# Patient Record
Sex: MALE
[De-identification: names, ages, dates, MRNs, and addresses within clinical notes are randomized per-mention and may not be internally consistent; named-entity substitution may affect disease eponyms.]

---

## 2024-08-18 ENCOUNTER — NON-APPOINTMENT (OUTPATIENT)
Age: 49
End: 2024-08-18

## 2024-08-26 PROBLEM — Z00.00 ENCOUNTER FOR PREVENTIVE HEALTH EXAMINATION: Status: ACTIVE | Noted: 2024-08-26

## 2024-08-27 ENCOUNTER — APPOINTMENT (OUTPATIENT)
Dept: ORTHOPEDIC SURGERY | Facility: CLINIC | Age: 49
End: 2024-08-27
Payer: OTHER MISCELLANEOUS

## 2024-08-27 DIAGNOSIS — S62.524A NONDISPLACED FRACTURE OF DISTAL PHALANX OF RIGHT THUMB, INITIAL ENCOUNTER FOR CLOSED FRACTURE: ICD-10-CM

## 2024-08-27 DIAGNOSIS — S63.641A SPRAIN OF METACARPOPHALANGEAL JOINT OF RIGHT THUMB, INITIAL ENCOUNTER: ICD-10-CM

## 2024-08-27 PROCEDURE — L3807: CPT | Mod: RT

## 2024-08-27 PROCEDURE — 99204 OFFICE O/P NEW MOD 45 MIN: CPT

## 2024-08-27 PROCEDURE — 73140 X-RAY EXAM OF FINGER(S): CPT | Mod: RT

## 2024-08-27 NOTE — ASSESSMENT
[FreeTextEntry1] : The condition was explained to the patient.  Fracture alignment within acceptable limits. Plan to proceed with non-operative treatment. Risks of treatment include, but are not limited to persistent pain, stiffness, fracture displacement, post-traumatic arthritis, need for future surgery, mal-union, non-union.  - MRI R thumb to evaluate for collateral ligament tear at MPJ. - Recommend thumb spica brace to immobilize thumb MPJ sprain and distal phalanx fracture, to be worn full time except hygiene. Fit for and provided brace in office today.  We discussed that there is a moderate risk of complications and morbidity with bracing, including skin irritation, skin breakdown, and stiffness from immobilization. They expressed understanding. - light activity: limit pinching, pushing, pulling, lifting, twisting, and at-risk activity.  - Work: recommend OOW (temporarily totally disabled).   F/u after MRI.  FORMAL REQUEST FOR AUTHORIZATION FOR MRI OF RIGHT THUMB. FORMAL REQUEST FOR AUTHORIZATION FOR DME - RIGHT THUMB SPICA BRACE.

## 2024-08-27 NOTE — IMAGING
[de-identified] : RIGHT HAND skin intact. mild swelling throughout thumb. TTP to thumb MPJ > dorsal radial IPJ. wrist ROM: good extension, flexion. good pronation, supination. good EPL, limited FPL. fingers good extension, flex to full fist. good finger abduction and adduction. + pain with thumb flexion/extension. numbness of tip of thumb. SILT to other digits. palpable radial pulse, brisk cap refill all digits. no triggering.  thumb MPJ stress: exam limited due to pain/guarding. - RCL: + pain, no gross instability. - UCL: + pain, no gross instability at neutral.   XRAYS OF RIGHT THUMB (3 views - PA, OBLIQUE, AND LATERAL VIEWS): thumb distal phalanx dorsal radial base fx with mild displacement. concentric IPJ.

## 2024-08-27 NOTE — IMAGING
[de-identified] : RIGHT HAND skin intact. mild swelling throughout thumb. TTP to thumb MPJ > dorsal radial IPJ. wrist ROM: good extension, flexion. good pronation, supination. good EPL, limited FPL. fingers good extension, flex to full fist. good finger abduction and adduction. + pain with thumb flexion/extension. numbness of tip of thumb. SILT to other digits. palpable radial pulse, brisk cap refill all digits. no triggering.  thumb MPJ stress: exam limited due to pain/guarding. - RCL: + pain, no gross instability. - UCL: + pain, no gross instability at neutral.   XRAYS OF RIGHT THUMB (3 views - PA, OBLIQUE, AND LATERAL VIEWS): thumb distal phalanx dorsal radial base fx with mild displacement. concentric IPJ.
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
## 2024-08-27 NOTE — WORK
[Fracture] : fracture [Sprain/Strain] : sprain/strain [Was the competent medical cause of the injury] : was the competent medical cause of the injury [I provided the services listed above] :  I provided the services listed above.
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
## 2024-09-03 ENCOUNTER — APPOINTMENT (OUTPATIENT)
Dept: MRI IMAGING | Facility: CLINIC | Age: 49
End: 2024-09-03
Payer: OTHER MISCELLANEOUS

## 2024-09-03 PROCEDURE — 73218 MRI UPPER EXTREMITY W/O DYE: CPT | Mod: RT

## 2024-09-13 ENCOUNTER — APPOINTMENT (OUTPATIENT)
Dept: ORTHOPEDIC SURGERY | Facility: CLINIC | Age: 49
End: 2024-09-13
Payer: OTHER MISCELLANEOUS

## 2024-09-13 DIAGNOSIS — S63.641A SPRAIN OF METACARPOPHALANGEAL JOINT OF RIGHT THUMB, INITIAL ENCOUNTER: ICD-10-CM

## 2024-09-13 DIAGNOSIS — S62.524A NONDISPLACED FRACTURE OF DISTAL PHALANX OF RIGHT THUMB, INITIAL ENCOUNTER FOR CLOSED FRACTURE: ICD-10-CM

## 2024-09-13 PROCEDURE — 99214 OFFICE O/P EST MOD 30 MIN: CPT

## 2024-09-13 NOTE — IMAGING
[de-identified] : RIGHT HAND skin intact. mild swelling throughout thumb.  TTP to thumb MPJ > dorsal radial IPJ. wrist ROM: good extension, flexion. good pronation, supination. good EPL, limited FPL. fingers good extension, flex to full fist. good finger abduction and adduction. numbness of tip of thumb. SILT to other digits. palpable radial pulse, brisk cap refill all digits. no triggering.
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
## 2024-09-13 NOTE — ASSESSMENT
[FreeTextEntry1] : MRI R thumb reviewed with patient - RCL tear, UCL tear with Stener lesion. The condition was explained to the patient.  Given UCL tear with Stener lesion, recommend surgery.  Discussed the risks and benefits of surgical vs non-surgical treatment. Discussed the risk of bleeding, infection - which may require antibiotics or surgical debridement - persistent pain, swelling, stiffness, loss of motion, weakness. Risk of persistent instability, post-traumatic arthritis, exacerbation of existing MPJ arthritis. Risk of re-rupture, which may require revision surgery. Risk of injury to tendons, blood vessels, and nerves - which may cause loss of function or loss of limb. Risk of scar tenderness, hypersensitivity, cold sensitivity, and CRPS. Will require therapy post-operatively to optimize range of motion and function. Surgery also carries a risk of complications related to anesthesia. All patient questions were answered. Patient expressed understanding and would like to proceed with RIGHT thumb UCL repair vs reconstruction with palmaris longus autograft. Also discussed possible temporary pinning of thumb MPJ.  - Reiterated importance of wearing thumb spica brace full time except hygiene. - light activity: avoid pinching, pushing, pulling, lifting, twisting, and at-risk activity.  - Work: continue OOW (temporarily totally disabled).   F/u after surgery.  FORMAL REQUEST FOR AUTHORIZATION FOR SURGERY FOR RIGHT THUMB.

## 2024-09-18 RX ORDER — SODIUM CHLORIDE 0.9 % (FLUSH) 0.9 %
3 SYRINGE (ML) INJECTION EVERY 8 HOURS
Refills: 0 | Status: DISCONTINUED | OUTPATIENT
Start: 2024-09-23 | End: 2024-10-07

## 2024-09-22 ENCOUNTER — TRANSCRIPTION ENCOUNTER (OUTPATIENT)
Age: 49
End: 2024-09-22

## 2024-09-23 ENCOUNTER — APPOINTMENT (OUTPATIENT)
Dept: ORTHOPEDIC SURGERY | Facility: HOSPITAL | Age: 49
End: 2024-09-23
Payer: OTHER MISCELLANEOUS

## 2024-09-23 ENCOUNTER — OUTPATIENT (OUTPATIENT)
Dept: OUTPATIENT SERVICES | Facility: HOSPITAL | Age: 49
LOS: 1 days | End: 2024-09-23
Payer: COMMERCIAL

## 2024-09-23 ENCOUNTER — TRANSCRIPTION ENCOUNTER (OUTPATIENT)
Age: 49
End: 2024-09-23

## 2024-09-23 VITALS
RESPIRATION RATE: 16 BRPM | HEIGHT: 69 IN | WEIGHT: 209 LBS | DIASTOLIC BLOOD PRESSURE: 84 MMHG | SYSTOLIC BLOOD PRESSURE: 142 MMHG | HEART RATE: 66 BPM | TEMPERATURE: 97 F | OXYGEN SATURATION: 99 %

## 2024-09-23 VITALS
HEART RATE: 83 BPM | TEMPERATURE: 98 F | SYSTOLIC BLOOD PRESSURE: 128 MMHG | OXYGEN SATURATION: 98 % | DIASTOLIC BLOOD PRESSURE: 74 MMHG | RESPIRATION RATE: 12 BRPM

## 2024-09-23 DIAGNOSIS — S63.641A SPRAIN OF METACARPOPHALANGEAL JOINT OF RIGHT THUMB, INITIAL ENCOUNTER: ICD-10-CM

## 2024-09-23 DIAGNOSIS — Z92.241 PERSONAL HISTORY OF SYSTEMIC STEROID THERAPY: Chronic | ICD-10-CM

## 2024-09-23 LAB
ANION GAP SERPL CALC-SCNC: 12 MMOL/L — SIGNIFICANT CHANGE UP (ref 5–17)
BUN SERPL-MCNC: 12 MG/DL — SIGNIFICANT CHANGE UP (ref 7–23)
CALCIUM SERPL-MCNC: 9.1 MG/DL — SIGNIFICANT CHANGE UP (ref 8.4–10.5)
CHLORIDE SERPL-SCNC: 104 MMOL/L — SIGNIFICANT CHANGE UP (ref 96–108)
CO2 SERPL-SCNC: 24 MMOL/L — SIGNIFICANT CHANGE UP (ref 22–31)
CREAT SERPL-MCNC: 1.26 MG/DL — SIGNIFICANT CHANGE UP (ref 0.5–1.3)
EGFR: 71 ML/MIN/1.73M2 — SIGNIFICANT CHANGE UP
GLUCOSE SERPL-MCNC: 92 MG/DL — SIGNIFICANT CHANGE UP (ref 70–99)
POTASSIUM SERPL-MCNC: 3.7 MMOL/L — SIGNIFICANT CHANGE UP (ref 3.5–5.3)
POTASSIUM SERPL-SCNC: 3.7 MMOL/L — SIGNIFICANT CHANGE UP (ref 3.5–5.3)
SODIUM SERPL-SCNC: 140 MMOL/L — SIGNIFICANT CHANGE UP (ref 135–145)

## 2024-09-23 PROCEDURE — 26540 REPAIR HAND JOINT: CPT | Mod: RT

## 2024-09-23 PROCEDURE — 36415 COLL VENOUS BLD VENIPUNCTURE: CPT

## 2024-09-23 PROCEDURE — 76000 FLUOROSCOPY <1 HR PHYS/QHP: CPT

## 2024-09-23 PROCEDURE — 80048 BASIC METABOLIC PNL TOTAL CA: CPT

## 2024-09-23 PROCEDURE — C1713: CPT

## 2024-09-23 PROCEDURE — 26540 REPAIR HAND JOINT: CPT | Mod: F5

## 2024-09-23 DEVICE — ULTRAFIX QUIKANCHR+ ETHBND 2-0: Type: IMPLANTABLE DEVICE | Site: RIGHT | Status: FUNCTIONAL

## 2024-09-23 DEVICE — KWIRE 1.6MM X 15.2CM: Type: IMPLANTABLE DEVICE | Site: RIGHT | Status: FUNCTIONAL

## 2024-09-23 RX ORDER — ONDANSETRON HCL/PF 4 MG/2 ML
4 VIAL (ML) INJECTION ONCE
Refills: 0 | Status: DISCONTINUED | OUTPATIENT
Start: 2024-09-23 | End: 2024-10-07

## 2024-09-23 RX ORDER — HYDROMORPHONE HYDROCHLORIDE 1 MG/ML
0.25 INJECTION, SOLUTION INTRAMUSCULAR; INTRAVENOUS; SUBCUTANEOUS
Refills: 0 | Status: DISCONTINUED | OUTPATIENT
Start: 2024-09-23 | End: 2024-09-23

## 2024-09-23 RX ORDER — CHLORHEXIDINE GLUCONATE ORAL RINSE 1.2 MG/ML
1 SOLUTION DENTAL ONCE
Refills: 0 | Status: COMPLETED | OUTPATIENT
Start: 2024-09-23 | End: 2024-09-23

## 2024-09-23 RX ORDER — OXYCODONE 5 MG/1
5 TABLET ORAL
Qty: 12 | Refills: 0 | Status: ACTIVE | COMMUNITY
Start: 2024-09-23 | End: 1900-01-01

## 2024-09-23 RX ORDER — LIDOCAINE HCL 20 MG/ML
0.2 AMPUL (ML) INJECTION ONCE
Refills: 0 | Status: COMPLETED | OUTPATIENT
Start: 2024-09-23 | End: 2024-09-23

## 2024-09-23 RX ORDER — OXYCODONE HYDROCHLORIDE 30 MG/1
5 TABLET, FILM COATED, EXTENDED RELEASE ORAL ONCE
Refills: 0 | Status: DISCONTINUED | OUTPATIENT
Start: 2024-09-23 | End: 2024-09-23

## 2024-09-23 RX ORDER — CEFAZOLIN SODIUM 1 G
2000 VIAL (EA) INJECTION ONCE
Refills: 0 | Status: COMPLETED | OUTPATIENT
Start: 2024-09-23 | End: 2024-09-23

## 2024-09-23 RX ORDER — DIPHENHYDRAMINE HCL 12.5MG/5ML
12.5 LIQUID (ML) ORAL ONCE
Refills: 0 | Status: DISCONTINUED | OUTPATIENT
Start: 2024-09-23 | End: 2024-10-07

## 2024-09-23 RX ADMIN — Medication 3 MILLILITER(S): at 11:44

## 2024-09-23 RX ADMIN — CHLORHEXIDINE GLUCONATE ORAL RINSE 1 APPLICATION(S): 1.2 SOLUTION DENTAL at 11:50

## 2024-09-23 NOTE — ASU DISCHARGE PLAN (ADULT/PEDIATRIC) - CALL YOUR DOCTOR IF YOU HAVE ANY OF THE FOLLOWING:
Bleeding that does not stop/Swelling that gets worse/Pain not relieved by Medications/Fever greater than (need to indicate Fahrenheit or Celsius)/Wound/Surgical Site with redness, or foul smelling discharge or pus/Numbness, tingling, color or temperature change to extremity/Nausea and vomiting that does not stop Bleeding that does not stop/Swelling that gets worse/Pain not relieved by Medications/Fever greater than (need to indicate Fahrenheit or Celsius)/Wound/Surgical Site with redness, or foul smelling discharge or pus/Numbness, tingling, color or temperature change to extremity/Nausea and vomiting that does not stop/Unable to urinate/Inability to tolerate liquids or foods

## 2024-09-23 NOTE — ASU DISCHARGE PLAN (ADULT/PEDIATRIC) - NURSING INSTRUCTIONS
Your first dose of Tylenol was given at ______5:30 PM_____.  Next dose of Tylenol will be on or after ___11:30 PM________ ,today/tonight and every 6 hours afterwards as needed for pain management, do not take any Tylenol containing products until this time. Do not exceed more than 4000mg of Tylenol in one 24 hour setting. If no contraindications, you may alternate with Ibuprofen  3 hours after dose of Tylenol. Ibuprofen can be taken every 6 hours.

## 2024-09-23 NOTE — ASU PATIENT PROFILE, ADULT - TEACHING/LEARNING LEARNING PREFERENCES
verbal instruction Split-Thickness Skin Graft Text: The defect edges were debeveled with a #15 scalpel blade.  Given the location of the defect, shape of the defect and the proximity to free margins a split thickness skin graft was deemed most appropriate.  Using a sterile surgical marker, the primary defect shape was transferred to the donor site. The split thickness graft was then harvested.  The skin graft was then placed in the primary defect and oriented appropriately.

## 2024-09-23 NOTE — ASU DISCHARGE PLAN (ADULT/PEDIATRIC) - MEDICATION INSTRUCTIONS
Norco 5/325mg, 1 tab up to every 4-6 hours as needed for severe pain Oxycodone 5mg, 1 tab up to every 4-6 hours as needed for severe pain

## 2024-09-23 NOTE — BRIEF OPERATIVE NOTE - NSICDXBRIEFPOSTOP_GEN_ALL_CORE_FT
POST-OP DIAGNOSIS:  Rupture of ulnar collateral ligament of right thumb 23-Sep-2024 18:02:50  Mireya Carty

## 2024-09-23 NOTE — ASU PATIENT PROFILE, ADULT - FALL HARM RISK - UNIVERSAL INTERVENTIONS
Bed in lowest position, wheels locked, appropriate side rails in place/Call bell, personal items and telephone in reach/Instruct patient to call for assistance before getting out of bed or chair/Non-slip footwear when patient is out of bed/[place] to call system/Physically safe environment - no spills, clutter or unnecessary equipment/Purposeful Proactive Rounding/Room/bathroom lighting operational, light cord in reach

## 2024-09-23 NOTE — ASU DISCHARGE PLAN (ADULT/PEDIATRIC) - NS MD DC FALL RISK RISK
For information on Fall & Injury Prevention, visit: [email or web address] OR  [email or web address] OR  https://www.cdc.gov/steadi/patient.html

## 2024-09-23 NOTE — PRE-ANESTHESIA EVALUATION ADULT - NSANTHOSAYNRD_GEN_A_CORE
No. JESUS ALBERTO screening performed.  STOP BANG Legend: 0-2 = LOW Risk; 3-4 = INTERMEDIATE Risk; 5-8 = HIGH Risk

## 2024-09-23 NOTE — BRIEF OPERATIVE NOTE - NSICDXBRIEFPREOP_GEN_ALL_CORE_FT
PRE-OP DIAGNOSIS:  Rupture of ulnar collateral ligament of right thumb, initial encounter 23-Sep-2024 18:02:41  Mireya Carty

## 2024-09-23 NOTE — PRE-ANESTHESIA EVALUATION ADULT - NSANTHADDINFOFT_GEN_ALL_CORE
Pt without PMHx on labs Cr 1.58   No prior labs available pt does not have a PCP  D/w Dr Carty. Procedure is time sensitive as if not done now will require more extensive surgery in the future  Discussed with pt. Explained to pt he needs to follow up with PCP for confirmation/work up of renal insufficiency. However will proceed with the case due to time sensitive nature of the procedure. However there is a risk of renal insufficiency getting worse after procedure/anesthesia. Pt understands and agrees to proceed. Will send repeat BMP for confirmation.

## 2024-09-23 NOTE — PRE-ANESTHESIA EVALUATION ADULT - NSANTHPMHFT_GEN_ALL_CORE
good ET no CP/SOB   denies GERD  denies prior PMHx denies HTN/DM/cardiac/pulm/neuro/renal/liver problems  herniated cervical disks s/p SANTI 2 yrs ago no sx currently

## 2024-10-01 ENCOUNTER — APPOINTMENT (OUTPATIENT)
Dept: ORTHOPEDIC SURGERY | Facility: CLINIC | Age: 49
End: 2024-10-01
Payer: OTHER MISCELLANEOUS

## 2024-10-01 DIAGNOSIS — S62.524A NONDISPLACED FRACTURE OF DISTAL PHALANX OF RIGHT THUMB, INITIAL ENCOUNTER FOR CLOSED FRACTURE: ICD-10-CM

## 2024-10-01 DIAGNOSIS — S63.641A SPRAIN OF METACARPOPHALANGEAL JOINT OF RIGHT THUMB, INITIAL ENCOUNTER: ICD-10-CM

## 2024-10-01 PROBLEM — M54.9 DORSALGIA, UNSPECIFIED: Chronic | Status: ACTIVE | Noted: 2024-09-18
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
## 2024-10-01 PROCEDURE — 29075 APPL CST ELBW FNGR SHORT ARM: CPT | Mod: RT,58

## 2024-10-01 PROCEDURE — 99024 POSTOP FOLLOW-UP VISIT: CPT

## 2024-10-01 NOTE — ASSESSMENT
[FreeTextEntry1] : - removed plaster thumb spica splint. - I personally applied a fiberglass short arm thumb spica cast. Reviewed cast care instructions - do not get cast wet, do not remove cast, do not put objects down cast. The importance of elevation was discussed with the patient. We discussed that there is a moderate risk of complications and morbidity with casting, including skin burn, skin irritation, skin breakdown, stiffness from immobilization, and compartment syndrome. We discussed the sign/symptoms of compartment syndrome that would warrant emergent evaluation in the office or ER, including severe swelling, worsening pain, numbness/tingling that does no resolve with elevation, and pale/white/cold fingers. They expressed understanding. - light activity: avoid pinching, pushing, pulling, lifting, twisting, and at-risk activity.  - Work: continue OOW (temporarily totally disabled).   F/u 2 weeks.

## 2024-10-01 NOTE — PHYSICAL EXAM
[de-identified] : RIGHT HAND incision clean, dry, and intact over thumb ulnar MPJ. no erythema or drainage.  skin intact. mild swelling throughout thumb.  good EPL, limited FPL. fingers good extension, flex to full fist. good finger abduction and adduction. numbness of tip of thumb. SILT to other digits. palpable radial pulse, brisk cap refill all digits. no triggering.
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
## 2024-10-15 ENCOUNTER — APPOINTMENT (OUTPATIENT)
Dept: ORTHOPEDIC SURGERY | Facility: CLINIC | Age: 49
End: 2024-10-15
Payer: OTHER MISCELLANEOUS

## 2024-10-15 DIAGNOSIS — S63.641A SPRAIN OF METACARPOPHALANGEAL JOINT OF RIGHT THUMB, INITIAL ENCOUNTER: ICD-10-CM

## 2024-10-15 DIAGNOSIS — S62.524A NONDISPLACED FRACTURE OF DISTAL PHALANX OF RIGHT THUMB, INITIAL ENCOUNTER FOR CLOSED FRACTURE: ICD-10-CM

## 2024-10-15 PROCEDURE — 99024 POSTOP FOLLOW-UP VISIT: CPT

## 2024-11-05 ENCOUNTER — APPOINTMENT (OUTPATIENT)
Dept: ORTHOPEDIC SURGERY | Facility: CLINIC | Age: 49
End: 2024-11-05
Payer: OTHER MISCELLANEOUS

## 2024-11-05 DIAGNOSIS — S62.524A NONDISPLACED FRACTURE OF DISTAL PHALANX OF RIGHT THUMB, INITIAL ENCOUNTER FOR CLOSED FRACTURE: ICD-10-CM

## 2024-11-05 DIAGNOSIS — S63.641A SPRAIN OF METACARPOPHALANGEAL JOINT OF RIGHT THUMB, INITIAL ENCOUNTER: ICD-10-CM

## 2024-11-05 PROCEDURE — 99024 POSTOP FOLLOW-UP VISIT: CPT

## 2024-12-03 ENCOUNTER — APPOINTMENT (OUTPATIENT)
Dept: ORTHOPEDIC SURGERY | Facility: CLINIC | Age: 49
End: 2024-12-03
Payer: OTHER MISCELLANEOUS

## 2024-12-03 DIAGNOSIS — M25.531 PAIN IN RIGHT WRIST: ICD-10-CM

## 2024-12-03 DIAGNOSIS — S63.641A SPRAIN OF METACARPOPHALANGEAL JOINT OF RIGHT THUMB, INITIAL ENCOUNTER: ICD-10-CM

## 2024-12-03 DIAGNOSIS — M25.631 STIFFNESS OF RIGHT WRIST, NOT ELSEWHERE CLASSIFIED: ICD-10-CM

## 2024-12-03 DIAGNOSIS — S62.524A NONDISPLACED FRACTURE OF DISTAL PHALANX OF RIGHT THUMB, INITIAL ENCOUNTER FOR CLOSED FRACTURE: ICD-10-CM

## 2024-12-03 PROCEDURE — 99024 POSTOP FOLLOW-UP VISIT: CPT

## 2025-01-21 ENCOUNTER — APPOINTMENT (OUTPATIENT)
Dept: ORTHOPEDIC SURGERY | Facility: CLINIC | Age: 50
End: 2025-01-21
Payer: OTHER MISCELLANEOUS

## 2025-01-21 DIAGNOSIS — S62.524A NONDISPLACED FRACTURE OF DISTAL PHALANX OF RIGHT THUMB, INITIAL ENCOUNTER FOR CLOSED FRACTURE: ICD-10-CM

## 2025-01-21 DIAGNOSIS — M25.631 STIFFNESS OF RIGHT WRIST, NOT ELSEWHERE CLASSIFIED: ICD-10-CM

## 2025-01-21 DIAGNOSIS — M25.531 PAIN IN RIGHT WRIST: ICD-10-CM

## 2025-01-21 DIAGNOSIS — S63.641A SPRAIN OF METACARPOPHALANGEAL JOINT OF RIGHT THUMB, INITIAL ENCOUNTER: ICD-10-CM

## 2025-01-21 PROCEDURE — 73110 X-RAY EXAM OF WRIST: CPT | Mod: RT

## 2025-01-21 PROCEDURE — 99213 OFFICE O/P EST LOW 20 MIN: CPT

## 2025-02-18 ENCOUNTER — APPOINTMENT (OUTPATIENT)
Dept: ORTHOPEDIC SURGERY | Facility: CLINIC | Age: 50
End: 2025-02-18
Payer: OTHER MISCELLANEOUS

## 2025-02-18 DIAGNOSIS — M25.531 PAIN IN RIGHT WRIST: ICD-10-CM

## 2025-02-18 DIAGNOSIS — S63.641A SPRAIN OF METACARPOPHALANGEAL JOINT OF RIGHT THUMB, INITIAL ENCOUNTER: ICD-10-CM

## 2025-02-18 DIAGNOSIS — S62.524A NONDISPLACED FRACTURE OF DISTAL PHALANX OF RIGHT THUMB, INITIAL ENCOUNTER FOR CLOSED FRACTURE: ICD-10-CM

## 2025-02-18 PROCEDURE — 99212 OFFICE O/P EST SF 10 MIN: CPT

## 2025-03-18 ENCOUNTER — APPOINTMENT (OUTPATIENT)
Dept: ORTHOPEDIC SURGERY | Facility: CLINIC | Age: 50
End: 2025-03-18
Payer: OTHER MISCELLANEOUS

## 2025-03-18 DIAGNOSIS — S63.641A SPRAIN OF METACARPOPHALANGEAL JOINT OF RIGHT THUMB, INITIAL ENCOUNTER: ICD-10-CM

## 2025-03-18 DIAGNOSIS — S62.524A NONDISPLACED FRACTURE OF DISTAL PHALANX OF RIGHT THUMB, INITIAL ENCOUNTER FOR CLOSED FRACTURE: ICD-10-CM

## 2025-03-18 DIAGNOSIS — M25.531 PAIN IN RIGHT WRIST: ICD-10-CM

## 2025-03-18 DIAGNOSIS — M25.631 STIFFNESS OF RIGHT WRIST, NOT ELSEWHERE CLASSIFIED: ICD-10-CM

## 2025-03-18 PROCEDURE — 99212 OFFICE O/P EST SF 10 MIN: CPT

## 2025-05-14 NOTE — PRE-ANESTHESIA EVALUATION ADULT - NSANTHASARD_GEN_ALL_CORE
Preferred method of results communication: Re-APP  Phone: Okay to leave a message containing results? Yes        Health Maintenance       Hepatitis B Vaccine (2 of 3 - 19+ 3-dose series)  Overdue since 6/8/2023    COVID-19 Vaccine (4 - 2024-25 season)  Overdue since 9/1/2024    Shingles Vaccine (1 of 2)  Never done    Pneumococcal Vaccine 50+ (1 of 1 - PCV)  Never done    Traditional Medicare- Medicare Wellness Visit (Yearly)  Scheduled for 5/14/2025           Following review of the above:  Patient is not proceeding with: COVID-19, Hep B, Pneumococcal, and Shingles    Note: Refer to final orders and clinician documentation.            Recent PHQ 2/9 Score       PHQ 2:     PHQ 2 Score Adult PHQ 2 Score Adult PHQ 2 Interpretation Little interest or pleasure in activity?   5/10/2025   7:34 AM 2  No further screening needed  1       Patient-reported          PHQ 9:             Advance Directives:  discussed and patient declined    3

## 2025-05-20 ENCOUNTER — APPOINTMENT (OUTPATIENT)
Dept: ORTHOPEDIC SURGERY | Facility: CLINIC | Age: 50
End: 2025-05-20

## (undated) DEVICE — Device

## (undated) DEVICE — SUT ETHILON 4-0 18" PS-2

## (undated) DEVICE — DRSG ACE BANDAGE 3"

## (undated) DEVICE — TOURNIQUET ESMARK 4"

## (undated) DEVICE — DRSG KLING 3"

## (undated) DEVICE — DRSG CAST PLASTER XFAST 3"

## (undated) DEVICE — WARMING BLANKET LOWER ADULT

## (undated) DEVICE — POSITIONER FOAM EGG CRATE ULNAR 2PCS (PINK)

## (undated) DEVICE — PACK HAND

## (undated) DEVICE — GLV 6.5 PROTEXIS (BLUE)

## (undated) DEVICE — TOURNIQUET CUFF 18" DUAL PORT DUAL BLADDER W PLC (BLACK)

## (undated) DEVICE — VENODYNE/SCD SLEEVE CALF MEDIUM

## (undated) DEVICE — DRSG ACE BANDAGE 4" NS

## (undated) DEVICE — NDL HYPO SAFE 18G X 1.5" (PINK)

## (undated) DEVICE — TOURNIQUET CUFF 24" DUAL PORT DUAL BLADDER W PLC (BLACK)

## (undated) DEVICE — DRSG WEBRIL 4"

## (undated) DEVICE — GLV 6 PROTEXIS (WHITE)

## (undated) DEVICE — PREP CHLORAPREP HI-LITE ORANGE 26ML

## (undated) DEVICE — SOL IRR POUR NS 0.9% 500ML

## (undated) DEVICE — DRSG COBAN 4"

## (undated) DEVICE — DRSG CAST PLASTER 3" (BLUE)

## (undated) DEVICE — SPECIMEN CONTAINER 100ML

## (undated) DEVICE — DRAPE TOWEL BLUE 17" X 24"